# Patient Record
Sex: FEMALE | Race: BLACK OR AFRICAN AMERICAN | Employment: FULL TIME | URBAN - METROPOLITAN AREA
[De-identification: names, ages, dates, MRNs, and addresses within clinical notes are randomized per-mention and may not be internally consistent; named-entity substitution may affect disease eponyms.]

---

## 2022-08-16 ENCOUNTER — TELEPHONE (OUTPATIENT)
Dept: ORTHOPEDIC SURGERY | Age: 22
End: 2022-08-16

## 2022-08-16 ENCOUNTER — OFFICE VISIT (OUTPATIENT)
Dept: ORTHOPEDIC SURGERY | Age: 22
End: 2022-08-16
Payer: COMMERCIAL

## 2022-08-16 VITALS — WEIGHT: 160 LBS | HEIGHT: 67 IN | BODY MASS INDEX: 25.11 KG/M2

## 2022-08-16 DIAGNOSIS — M79.604 RIGHT LEG PAIN: Primary | ICD-10-CM

## 2022-08-16 DIAGNOSIS — S76.111A QUADRICEPS STRAIN, RIGHT, INITIAL ENCOUNTER: ICD-10-CM

## 2022-08-16 PROCEDURE — 99203 OFFICE O/P NEW LOW 30 MIN: CPT | Performed by: ORTHOPAEDIC SURGERY

## 2022-08-16 RX ORDER — MOXIFLOXACIN 5 MG/ML
SOLUTION/ DROPS OPHTHALMIC
COMMUNITY
Start: 2022-05-20 | End: 2022-08-16

## 2022-08-16 NOTE — LETTER
Injury Report    Name: Bam High                                                        Date of Visit: 8/16/2022  Sport: Soccer                                                                      Date of Injury: Body Part: [] Neck     [] Shoulder     [] Elbow     [] Hand/Wrist     [] Back                     [] Hip        [] Knee           [] Foot/Ankle     [x] Other (Specify): R Leg    Encounter diagnosis: R Quad strain    Restrictions:              [] Athlete allowed to practice/compete as tolerated            [x] Athlete is NOT allowed to practice/compete            [] Athlete is allowed to practice with the following restrictions:             [] Upper body workout ONLY             [] Lower body workout ONLY            [x] Special instructions: No soccer. Complete rehab strengthening. Obtain MRI.      Return Visit: FU after MRI     If there are any questions regarding this athlete's injury or treatment plan, please feel free to contact:    Ferdinand Norton MD  [phone]  [address] [place] [address],[address]

## 2022-08-16 NOTE — TELEPHONE ENCOUNTER
General Question     Subject: Patient's mom requesting the MRI to be done Wednesday 8-17-22 or Thursday 8-18-22. Please advise.   Patient's Mother:Paty Colon  Contact Number: [phone]

## 2022-08-16 NOTE — PROGRESS NOTES
Erica Alexandra is seen today for evaluation of her right quadricep. She is a  playing soccer at the [facility] [employer]. She played undergraduate at [employer]. She has significant history involving her right knee dating back to 2016. She had a lateral meniscus repair 2016 and a lateral meniscectomy in 2018. She then underwent a scope cleanout and also had a lateral meniscus transplant and a ALMA DELIA procedure in 2019. Starting around July 20 this year she noticed quad soreness that would come on after training. She reported to the [facility] in July 25 and was told that she could continue to train. Her pain has been worsening and now is 8 out of 10 when she tries to run. She has had taping, stem, and laser but still has pain. Pain is in the right mid quadrant not in the knee. She is otherwise healthy. History: Patient's relevant past family, medical, and social history are reviewed as part of today's visit. ROS of pertinent positives and negatives as above; otherwise negative. General Exam:    Vitals: Height 5' 7\" (1.702 m), weight 160 lb (72.6 kg). Constitutional: Patient is adequately groomed with no evidence of malnutrition  Mental Status: The patient is oriented to time, place and person. The patient's mood and affect are appropriate. Gait:  Patient walks with normal gait and station. Lymphatic: The lymphatic examination bilaterally reveals all areas to be without enlargement or induration. Vascular: Examination reveals no swelling or calf tenderness. Peripheral pulses are palpable and 2+. Neurological: The patient has good coordination. There is no weakness or sensory deficit. Skin:    Head/Neck: inspection reveals no rashes, ulcerations or lesions. Trunk:  inspection reveals no rashes, ulcerations or lesions. Right Lower Extremity: inspection reveals no rashes, ulcerations or lesions.   Left Lower Extremity: inspection reveals no rashes, ulcerations or lesions. Examination of the bilateral hips reveals normal flexion and extension. There is no restriction in rotation. There is no tenderness to palpation anteriorly posteriorly or laterally. Left knee examination demonstrates no effusion. There is no tenderness to palpation over the medial or lateral joint line. There is no discomfort over the patellar tendon. There is no palpable popliteal cyst.  Sensation is intact. Range of motion is normal.  There is no patellofemoral crepitation. There is no instability to varus or valgus stress applied at 0, 30, 60, or 90Â° of flexion. There is no anterior or posterior drawer. Lachman examination is normal.    Right knee has moderate crepitation but no drainable effusion. She is well-healed incisions. She has no joint line pain. With pain over the quad and pain with terminal knee flexion as well as quad stretch. Heel to buttock distance is about 12 inches. She has no obvious deformity. 2 view x-rays right femur obtained today in the office and interpreted by me show right knee arthritis but no acute bony abnormalities about the femur. Assessment: Ongoing right quad strain that has not responded to more than 3 weeks of directed treatment    Plan: MRI right quad. Follow-up with me after the scan. In the meantime we are restricting her to no soccer.

## 2022-08-17 NOTE — TELEPHONE ENCOUNTER
Called [facility] again. No answer. [address] and left message that MRI is in clinical review with insurance. If she would like to be self pay, then [facility] could possibly scan her sooner. Please call [employer] for a move up if you want to be self pay.

## 2024-06-13 ENCOUNTER — APPOINTMENT (RX ONLY)
Dept: URBAN - METROPOLITAN AREA CLINIC 88 | Facility: CLINIC | Age: 24
Setting detail: DERMATOLOGY
End: 2024-06-13

## 2024-06-13 DIAGNOSIS — B07.8 OTHER VIRAL WARTS: ICD-10-CM

## 2024-06-13 DIAGNOSIS — L91.0 HYPERTROPHIC SCAR: ICD-10-CM

## 2024-06-13 DIAGNOSIS — L81.4 OTHER MELANIN HYPERPIGMENTATION: ICD-10-CM

## 2024-06-13 PROCEDURE — ? SEPARATE AND IDENTIFIABLE DOCUMENTATION

## 2024-06-13 PROCEDURE — ? LIQUID NITROGEN

## 2024-06-13 PROCEDURE — 99204 OFFICE O/P NEW MOD 45 MIN: CPT | Mod: 25

## 2024-06-13 PROCEDURE — ? TREATMENT REGIMEN

## 2024-06-13 PROCEDURE — ? COUNSELING

## 2024-06-13 PROCEDURE — 17110 DESTRUCTION B9 LES UP TO 14: CPT

## 2024-06-13 PROCEDURE — ? PRESCRIPTION

## 2024-06-13 RX ORDER — HYDROQUINONE 6 %
EMULSION (GRAM) TOPICAL
Qty: 30 | Refills: 2 | Status: ERX | COMMUNITY
Start: 2024-06-13

## 2024-06-13 RX ADMIN — Medication: at 00:00

## 2024-06-13 ASSESSMENT — LOCATION SIMPLE DESCRIPTION DERM
LOCATION SIMPLE: RIGHT LIP
LOCATION SIMPLE: LEFT LIP
LOCATION SIMPLE: LEFT AXILLARY VAULT
LOCATION SIMPLE: RIGHT EAR
LOCATION SIMPLE: RIGHT AXILLARY VAULT

## 2024-06-13 ASSESSMENT — SEVERITY ASSESSMENT: SEVERITY: MILD TO MODERATE

## 2024-06-13 ASSESSMENT — LOCATION DETAILED DESCRIPTION DERM
LOCATION DETAILED: RIGHT AXILLARY VAULT
LOCATION DETAILED: RIGHT SUPERIOR VERMILION LIP
LOCATION DETAILED: LEFT SUPERIOR VERMILION LIP
LOCATION DETAILED: RIGHT ANTIHELIX
LOCATION DETAILED: LEFT AXILLARY VAULT

## 2024-06-13 ASSESSMENT — LOCATION ZONE DERM
LOCATION ZONE: LIP
LOCATION ZONE: AXILLAE
LOCATION ZONE: EAR

## 2024-06-13 ASSESSMENT — SCAR ASSESSEMENT OVERALL: SCAR ASSESSMENT: 1.5 (FLAT SCAR, ERYTHEMA ONLY)

## 2024-06-13 NOTE — PROCEDURE: LIQUID NITROGEN
Number Of Freeze-Thaw Cycles: 2 freeze-thaw cycles
Medical Necessity Clause: This procedure was medically necessary because the lesions that were treated were:
Spray Paint Technique: No
Medical Necessity Information: It is in your best interest to select a reason for this procedure from the list below. All of these items fulfill various CMS LCD requirements except the new and changing color options.
Show Spray Paint Technique Variable?: Yes
Post-Care Instructions: I reviewed with the patient in detail post-care instructions. Patient is to wear sunprotection, and avoid picking at any of the treated lesions. Pt may apply Vaseline to crusted or scabbing areas.
Duration Of Freeze Thaw-Cycle (Seconds): 5-10
Detail Level: Simple
Spray Paint Text: The liquid nitrogen was applied to the skin utilizing a spray paint frosting technique.
Consent: The patient's consent was obtained including but not limited to risks of crusting, scabbing, blistering, scarring, darker or lighter pigmentary change, recurrence, incomplete removal and infection.

## 2024-06-27 ENCOUNTER — APPOINTMENT (RX ONLY)
Dept: URBAN - METROPOLITAN AREA CLINIC 88 | Facility: CLINIC | Age: 24
Setting detail: DERMATOLOGY
End: 2024-06-27

## 2024-06-27 DIAGNOSIS — B07.8 OTHER VIRAL WARTS: ICD-10-CM

## 2024-06-27 PROCEDURE — ? SEPARATE AND IDENTIFIABLE DOCUMENTATION

## 2024-06-27 PROCEDURE — 17110 DESTRUCTION B9 LES UP TO 14: CPT

## 2024-06-27 PROCEDURE — ? COUNSELING

## 2024-06-27 PROCEDURE — 99213 OFFICE O/P EST LOW 20 MIN: CPT | Mod: 25

## 2024-06-27 PROCEDURE — ? TREATMENT REGIMEN

## 2024-06-27 PROCEDURE — ? LIQUID NITROGEN

## 2024-06-27 ASSESSMENT — LOCATION SIMPLE DESCRIPTION DERM
LOCATION SIMPLE: LEFT LIP
LOCATION SIMPLE: RIGHT LIP

## 2024-06-27 ASSESSMENT — LOCATION DETAILED DESCRIPTION DERM
LOCATION DETAILED: RIGHT SUPERIOR VERMILION LIP
LOCATION DETAILED: LEFT SUPERIOR VERMILION LIP

## 2024-06-27 ASSESSMENT — LOCATION ZONE DERM: LOCATION ZONE: LIP

## 2024-12-17 ENCOUNTER — APPOINTMENT (OUTPATIENT)
Dept: URBAN - METROPOLITAN AREA CLINIC 88 | Facility: CLINIC | Age: 24
Setting detail: DERMATOLOGY
End: 2024-12-17

## 2024-12-17 DIAGNOSIS — B07.8 OTHER VIRAL WARTS: ICD-10-CM

## 2024-12-17 PROCEDURE — ? COUNSELING

## 2024-12-17 PROCEDURE — ? LIQUID NITROGEN

## 2024-12-17 PROCEDURE — 17110 DESTRUCTION B9 LES UP TO 14: CPT

## 2024-12-17 PROCEDURE — ? TREATMENT REGIMEN

## 2024-12-17 PROCEDURE — 99213 OFFICE O/P EST LOW 20 MIN: CPT | Mod: 25

## 2024-12-17 PROCEDURE — ? SEPARATE AND IDENTIFIABLE DOCUMENTATION

## 2024-12-17 ASSESSMENT — LOCATION SIMPLE DESCRIPTION DERM
LOCATION SIMPLE: LEFT LIP
LOCATION SIMPLE: RIGHT LIP

## 2024-12-17 ASSESSMENT — LOCATION ZONE DERM: LOCATION ZONE: LIP

## 2024-12-17 ASSESSMENT — LOCATION DETAILED DESCRIPTION DERM
LOCATION DETAILED: RIGHT SUPERIOR VERMILION LIP
LOCATION DETAILED: LEFT SUPERIOR VERMILION LIP